# Patient Record
(demographics unavailable — no encounter records)

---

## 2019-08-19 NOTE — ULT
RIGHT LOWER EXTREMITY VENOUS DUPLEX:



INDICATIONS:

Right lower extremity pain and edema.



TECHNIQUE:

Deep veins evaluated with color Doppler, spectral analysis, and compression.



FINDINGS:

Deep veins interrogated include common femoral vein, femoral vein, popliteal vein, and posterior tibi
al vein.  These veins show normal compression and blood flow.  No evidence of DVT.



There is a focal, circumscribed, oval-shaped fluid echogenicity focus of the medial right popliteal f
hussein, slightly greater than 3 cm in length.  An additional complex-appearing, fluid collection is

seen at the inferior right leg soft tissues, between 6 and 7 cm in length.



IMPRESSION:

1.  Negative right venous duplex exam.



2.  Foci of fluid echogenicity of the medial right popliteal fossa and distal right leg.



Findings are nonspecific and could relate at least in part to a Baker cyst formation of the popliteal
 fossa.  Additional considerations, given additional fluid collection of the distal right leg,

would include localized edema or infectious/inflammatory fluid collection.  Recommend clinical correl
ation and, as necessary, imaging followup may be obtained.



Transcribed Date/Time: 8/19/2019 5:51 PM



Reported By: Jonh Tyler 

Electronically Signed:  8/21/2019 1:59 PM